# Patient Record
Sex: MALE | URBAN - NONMETROPOLITAN AREA
[De-identification: names, ages, dates, MRNs, and addresses within clinical notes are randomized per-mention and may not be internally consistent; named-entity substitution may affect disease eponyms.]

---

## 2023-10-07 ENCOUNTER — APPOINTMENT (OUTPATIENT)
Dept: RADIOLOGY | Facility: HOSPITAL | Age: 48
End: 2023-10-07
Payer: COMMERCIAL

## 2023-10-07 ENCOUNTER — HOSPITAL ENCOUNTER (EMERGENCY)
Facility: HOSPITAL | Age: 48
Discharge: HOME | End: 2023-10-07
Attending: EMERGENCY MEDICINE
Payer: COMMERCIAL

## 2023-10-07 VITALS
HEIGHT: 70 IN | RESPIRATION RATE: 16 BRPM | OXYGEN SATURATION: 97 % | HEART RATE: 76 BPM | DIASTOLIC BLOOD PRESSURE: 73 MMHG | BODY MASS INDEX: 24.77 KG/M2 | SYSTOLIC BLOOD PRESSURE: 118 MMHG | TEMPERATURE: 97.3 F | WEIGHT: 173 LBS

## 2023-10-07 DIAGNOSIS — R10.9 RIGHT FLANK PAIN: Primary | ICD-10-CM

## 2023-10-07 LAB
ALBUMIN SERPL BCP-MCNC: 4.3 G/DL (ref 3.4–5)
ALP SERPL-CCNC: 46 U/L (ref 33–120)
ALT SERPL W P-5'-P-CCNC: 23 U/L (ref 10–52)
ANION GAP SERPL CALC-SCNC: 11 MMOL/L (ref 10–20)
APPEARANCE UR: CLEAR
AST SERPL W P-5'-P-CCNC: 26 U/L (ref 9–39)
BASOPHILS # BLD AUTO: 0.08 X10*3/UL (ref 0–0.1)
BASOPHILS NFR BLD AUTO: 1 %
BILIRUB SERPL-MCNC: 0.5 MG/DL (ref 0–1.2)
BILIRUB UR STRIP.AUTO-MCNC: NEGATIVE MG/DL
BUN SERPL-MCNC: 8 MG/DL (ref 6–23)
CALCIUM SERPL-MCNC: 8.7 MG/DL (ref 8.6–10.3)
CHLORIDE SERPL-SCNC: 105 MMOL/L (ref 98–107)
CO2 SERPL-SCNC: 28 MMOL/L (ref 21–32)
COLOR UR: YELLOW
CREAT SERPL-MCNC: 0.73 MG/DL (ref 0.5–1.3)
EOSINOPHIL # BLD AUTO: 0.24 X10*3/UL (ref 0–0.7)
EOSINOPHIL NFR BLD AUTO: 2.9 %
ERYTHROCYTE [DISTWIDTH] IN BLOOD BY AUTOMATED COUNT: 12 % (ref 11.5–14.5)
GFR SERPL CREATININE-BSD FRML MDRD: >90 ML/MIN/1.73M*2
GLUCOSE SERPL-MCNC: 114 MG/DL (ref 74–99)
GLUCOSE UR STRIP.AUTO-MCNC: NEGATIVE MG/DL
HCT VFR BLD AUTO: 39.7 % (ref 41–52)
HGB BLD-MCNC: 13.3 G/DL (ref 13.5–17.5)
IMM GRANULOCYTES # BLD AUTO: 0.03 X10*3/UL (ref 0–0.7)
IMM GRANULOCYTES NFR BLD AUTO: 0.4 % (ref 0–0.9)
KETONES UR STRIP.AUTO-MCNC: NEGATIVE MG/DL
LEUKOCYTE ESTERASE UR QL STRIP.AUTO: NEGATIVE
LIPASE SERPL-CCNC: 15 U/L (ref 9–82)
LYMPHOCYTES # BLD AUTO: 1.44 X10*3/UL (ref 1.2–4.8)
LYMPHOCYTES NFR BLD AUTO: 17.3 %
MCH RBC QN AUTO: 32 PG (ref 26–34)
MCHC RBC AUTO-ENTMCNC: 33.5 G/DL (ref 32–36)
MCV RBC AUTO: 95 FL (ref 80–100)
MONOCYTES # BLD AUTO: 0.59 X10*3/UL (ref 0.1–1)
MONOCYTES NFR BLD AUTO: 7.1 %
MUCOUS THREADS #/AREA URNS AUTO: NORMAL /LPF
NEUTROPHILS # BLD AUTO: 5.96 X10*3/UL (ref 1.2–7.7)
NEUTROPHILS NFR BLD AUTO: 71.3 %
NITRITE UR QL STRIP.AUTO: NEGATIVE
NRBC BLD-RTO: 0 /100 WBCS (ref 0–0)
PH UR STRIP.AUTO: 7 [PH]
PLATELET # BLD AUTO: 312 X10*3/UL (ref 150–450)
PMV BLD AUTO: 9.1 FL (ref 7.5–11.5)
POTASSIUM SERPL-SCNC: 4.1 MMOL/L (ref 3.5–5.3)
PROT SERPL-MCNC: 6.8 G/DL (ref 6.4–8.2)
PROT UR STRIP.AUTO-MCNC: ABNORMAL MG/DL
RBC # BLD AUTO: 4.16 X10*6/UL (ref 4.5–5.9)
RBC # UR STRIP.AUTO: NEGATIVE /UL
RBC #/AREA URNS AUTO: NORMAL /HPF
SODIUM SERPL-SCNC: 140 MMOL/L (ref 136–145)
SP GR UR STRIP.AUTO: 1.01
UROBILINOGEN UR STRIP.AUTO-MCNC: <2 MG/DL
WBC # BLD AUTO: 8.3 X10*3/UL (ref 4.4–11.3)
WBC #/AREA URNS AUTO: NORMAL /HPF

## 2023-10-07 PROCEDURE — 83690 ASSAY OF LIPASE: CPT | Performed by: EMERGENCY MEDICINE

## 2023-10-07 PROCEDURE — 2500000004 HC RX 250 GENERAL PHARMACY W/ HCPCS (ALT 636 FOR OP/ED): Performed by: EMERGENCY MEDICINE

## 2023-10-07 PROCEDURE — 85025 COMPLETE CBC W/AUTO DIFF WBC: CPT | Performed by: EMERGENCY MEDICINE

## 2023-10-07 PROCEDURE — 96374 THER/PROPH/DIAG INJ IV PUSH: CPT | Mod: 59

## 2023-10-07 PROCEDURE — 96361 HYDRATE IV INFUSION ADD-ON: CPT | Mod: 59

## 2023-10-07 PROCEDURE — 74176 CT ABD & PELVIS W/O CONTRAST: CPT | Mod: MG,59

## 2023-10-07 PROCEDURE — 2550000001 HC RX 255 CONTRASTS: Performed by: EMERGENCY MEDICINE

## 2023-10-07 PROCEDURE — A9698 NON-RAD CONTRAST MATERIALNOC: HCPCS | Performed by: EMERGENCY MEDICINE

## 2023-10-07 PROCEDURE — 74178 CT ABD&PLV WO CNTR FLWD CNTR: CPT | Performed by: RADIOLOGY

## 2023-10-07 PROCEDURE — 96375 TX/PRO/DX INJ NEW DRUG ADDON: CPT

## 2023-10-07 PROCEDURE — 99253 IP/OBS CNSLTJ NEW/EST LOW 45: CPT | Performed by: SURGERY

## 2023-10-07 PROCEDURE — 74177 CT ABD & PELVIS W/CONTRAST: CPT

## 2023-10-07 PROCEDURE — 99285 EMERGENCY DEPT VISIT HI MDM: CPT | Mod: 25 | Performed by: EMERGENCY MEDICINE

## 2023-10-07 PROCEDURE — 51702 INSERT TEMP BLADDER CATH: CPT

## 2023-10-07 PROCEDURE — 80053 COMPREHEN METABOLIC PANEL: CPT | Performed by: EMERGENCY MEDICINE

## 2023-10-07 PROCEDURE — 81003 URINALYSIS AUTO W/O SCOPE: CPT | Performed by: EMERGENCY MEDICINE

## 2023-10-07 PROCEDURE — 36415 COLL VENOUS BLD VENIPUNCTURE: CPT | Performed by: EMERGENCY MEDICINE

## 2023-10-07 RX ORDER — CYCLOBENZAPRINE HCL 10 MG
10 TABLET ORAL 3 TIMES DAILY PRN
Qty: 10 TABLET | Refills: 0 | Status: SHIPPED | OUTPATIENT
Start: 2023-10-07

## 2023-10-07 RX ORDER — KETOROLAC TROMETHAMINE 30 MG/ML
15 INJECTION, SOLUTION INTRAMUSCULAR; INTRAVENOUS ONCE
Status: COMPLETED | OUTPATIENT
Start: 2023-10-07 | End: 2023-10-07

## 2023-10-07 RX ORDER — OXYCODONE AND ACETAMINOPHEN 5; 325 MG/1; MG/1
1 TABLET ORAL EVERY 8 HOURS PRN
Qty: 9 TABLET | Refills: 0 | Status: SHIPPED | OUTPATIENT
Start: 2023-10-07 | End: 2023-10-10

## 2023-10-07 RX ORDER — MORPHINE SULFATE 4 MG/ML
4 INJECTION, SOLUTION INTRAMUSCULAR; INTRAVENOUS ONCE
Status: COMPLETED | OUTPATIENT
Start: 2023-10-07 | End: 2023-10-07

## 2023-10-07 RX ADMIN — HYDROMORPHONE HYDROCHLORIDE 0.5 MG: 1 INJECTION, SOLUTION INTRAMUSCULAR; INTRAVENOUS; SUBCUTANEOUS at 08:15

## 2023-10-07 RX ADMIN — SODIUM CHLORIDE 1000 ML: 9 INJECTION, SOLUTION INTRAVENOUS at 07:41

## 2023-10-07 RX ADMIN — MORPHINE SULFATE 4 MG: 4 INJECTION, SOLUTION INTRAMUSCULAR; INTRAVENOUS at 12:22

## 2023-10-07 RX ADMIN — IOHEXOL 500 ML: 12 SOLUTION ORAL at 09:54

## 2023-10-07 RX ADMIN — KETOROLAC TROMETHAMINE 15 MG: 30 INJECTION, SOLUTION INTRAMUSCULAR at 07:41

## 2023-10-07 RX ADMIN — IOHEXOL 90 ML: 350 INJECTION, SOLUTION INTRAVENOUS at 11:20

## 2023-10-07 ASSESSMENT — PAIN DESCRIPTION - PAIN TYPE: TYPE: ACUTE PAIN

## 2023-10-07 ASSESSMENT — COLUMBIA-SUICIDE SEVERITY RATING SCALE - C-SSRS
2. HAVE YOU ACTUALLY HAD ANY THOUGHTS OF KILLING YOURSELF?: NO
1. IN THE PAST MONTH, HAVE YOU WISHED YOU WERE DEAD OR WISHED YOU COULD GO TO SLEEP AND NOT WAKE UP?: NO
6. HAVE YOU EVER DONE ANYTHING, STARTED TO DO ANYTHING, OR PREPARED TO DO ANYTHING TO END YOUR LIFE?: NO

## 2023-10-07 ASSESSMENT — PAIN DESCRIPTION - PROGRESSION: CLINICAL_PROGRESSION: GRADUALLY WORSENING

## 2023-10-07 ASSESSMENT — PAIN SCALES - GENERAL
PAINLEVEL_OUTOF10: 5 - MODERATE PAIN
PAINLEVEL_OUTOF10: 8

## 2023-10-07 ASSESSMENT — PAIN - FUNCTIONAL ASSESSMENT
PAIN_FUNCTIONAL_ASSESSMENT: 0-10
PAIN_FUNCTIONAL_ASSESSMENT: 0-10

## 2023-10-07 ASSESSMENT — PAIN DESCRIPTION - FREQUENCY: FREQUENCY: CONSTANT/CONTINUOUS

## 2023-10-07 ASSESSMENT — PAIN DESCRIPTION - LOCATION: LOCATION: BACK

## 2023-10-07 ASSESSMENT — PAIN DESCRIPTION - ORIENTATION
ORIENTATION: RIGHT
ORIENTATION: RIGHT

## 2023-10-07 ASSESSMENT — PAIN DESCRIPTION - ONSET: ONSET: SUDDEN

## 2023-10-07 NOTE — ED PROVIDER NOTES
HPI   Chief Complaint   Patient presents with   • Back Pain     Patient to ED reference right sided back pain that radiates around into his flank that awoke he x 3 hours prior to ED. Negative any urinary changes and no history of kidney stones.       Limitations to History: None    HPI: 48-year-old male presents with concern for right flank pain which began 3 hours prior to arrival.  Sharp in nature.  Nonradiating.  Denies any fever, chills, nausea, vomiting, urinary symptoms.  Denies any fall or trauma.    ------------------------------------------------------------------------------------------------------------------------------------------  Physical Exam:    VS: As documented in the triage note and EMR flowsheet from this visit were reviewed.    Appearance: Alert. cooperative,  in no acute distress.   Skin: Intact,  dry skin, no lesions, rash, petechiae or purpura.   Eyes: PERRLA, EOMs intact,  Conjunctiva pink with no redness or exudates.   HENT: Normocephalic, atraumatic. Nares patent. No intraoral lesions.   Neck: Supple, without meningismus. Trachea at midline. No lymphadenopathy.  Pulmonary: Clear bilaterally with good chest wall excursion. No rales, rhonchi or wheezing. No accessory muscle use or stridor.  Cardiac: Regular rate and rhythm, no rubs, murmurs, or gallops. No JVD, Carotids without bruits.  Abdomen: Abdomen is soft, nontender, and nondistended.  No palpable organomegaly.  No rebound or guarding.  No CVA tenderness. Nonsurgical abdomen  Genitourinary: Exam deferred.  Musculoskeletal: Full range of motion.  Pulses full and equal. No cyanosis, clubbing, or edema.  Psychiatric: Appropriate mood and affect.                            No data recorded                Patient History   No past medical history on file.  No past surgical history on file.  No family history on file.  Social History     Tobacco Use   • Smoking status: Not on file   • Smokeless tobacco: Not on file   Substance Use Topics    • Alcohol use: Not on file   • Drug use: Not on file       Physical Exam   ED Triage Vitals [10/07/23 0710]   Temp Heart Rate Resp BP   36.3 °C (97.3 °F) 63 16 114/73      SpO2 Temp Source Heart Rate Source Patient Position   95 % Tympanic Monitor Lying      BP Location FiO2 (%)     Left arm --       Physical Exam    ED Course & MDM   Diagnoses as of 10/07/23 1308   Right flank pain       Medical Decision Making  Labs Reviewed  URINALYSIS WITH REFLEX MICROSCOPIC - Abnormal     Color, Urine                  Yellow                 Appearance, Urine             Clear                  Specific Gravity, Urine       1.012                  pH, Urine                     7.0                    Protein, Urine                30 (1+) (*)               Glucose, Urine                NEGATIVE                Blood, Urine                  NEGATIVE                Ketones, Urine                NEGATIVE                Bilirubin, Urine              NEGATIVE                Urobilinogen, Urine           <2.0                   Nitrite, Urine                NEGATIVE                Leukocyte Esterase, Urine     NEGATIVE             COMPREHENSIVE METABOLIC PANEL - Abnormal     Glucose                       114 (*)                Sodium                        140                    Potassium                     4.1                    Chloride                      105                    Bicarbonate                   28                     Anion Gap                     11                     Urea Nitrogen                 8                      Creatinine                    0.73                   eGFR                          >90                    Calcium                       8.7                    Albumin                       4.3                    Alkaline Phosphatase          46                     Total Protein                 6.8                    AST                           26                     Bilirubin, Total              0.5                     ALT                           23                  CBC WITH AUTO DIFFERENTIAL - Abnormal     WBC                           8.3                    nRBC                          0.0                    RBC                           4.16 (*)               Hemoglobin                    13.3 (*)               Hematocrit                    39.7 (*)               MCV                           95                     MCH                           32.0                   MCHC                          33.5                   RDW                           12.0                   Platelets                     312                    MPV                           9.1                    Neutrophils %                 71.3                   Immature Granulocytes %, Automated   0.4                    Lymphocytes %                 17.3                   Monocytes %                   7.1                    Eosinophils %                 2.9                    Basophils %                   1.0                    Neutrophils Absolute          5.96                   Immature Granulocytes Absolute, Au*   0.03                   Lymphocytes Absolute          1.44                   Monocytes Absolute            0.59                   Eosinophils Absolute          0.24                   Basophils Absolute            0.08                LIPASE - Normal     Lipase                        15                         Narrative: Venipuncture immediately after or during the administration of Metamizole may lead to falsely low results. Testing should be performed immediately prior to Metamizole dosing.  URINALYSIS MICROSCOPIC ONLY  CT abdomen pelvis w IV contrast   Final Result    Persistent left mid abdominal mesenteric swirling and narrowing of    redundant distal colon and small bowel loops concerning for internal    hernia. No evidence of associated bowel obstruction at this time,    although the proximal colon is distended with gas and  fecal debris.          Interval placement of Mcbride catheter with small amount of air in the    bladder. The bladder remains relatively distended.          MACRO:    None.          Signed by: Rosa Loera 10/7/2023 11:55 AM    Dictation workstation:   [id number]     CT abdomen pelvis wo IV contrast   Final Result    No evidence of urolithiasis or hydronephrosis. Distended urinary    bladder. Correlate with possible urinary retention or bladder outlet    obstruction.          Fecal and gaseous distention of the proximal colon. Unusual medial    displacement and tapering of the mid descending colon raising a    question of internal hernia.          Additional findings as described above.          MACRO:    None.          Signed by: Rosa Loera 10/7/2023 8:36 AM    Dictation workstation:   [id number]         Medical Decision Making:    Patient appears well and nontoxic.  Vital signs within normal limits.  Right flank pain.  Benign abdominal exam.  Patient treated with Toradol and 1 L of normal saline.  Patient continued to have pain and will be treated with intravenous Dilaudid.  CT shows no evidence of kidney stone but there is concern for possible descending: Internal hernia.  General surgery consulted, Dr. Sippy, who advised repeat CT with oral contrast.  Patient also had distended bladder on CT.  400 mL postvoid residual.  Mcbride catheter placed without resolution of his pain.  Repeat CT continues to show this concern for internal hernia.  General surgery again evaluates the patient at the bedside and speaks with radiology.  Patient has no pain overlying this area.  Decision was made that although he may have an internal hernia that is likely not the cause of his current pain and patient does wish to return to [place] for evaluation.  Patient will be driven by a friend home.  Patient also will be advised to follow-up with a urologist.  Stable at time of discharge.    Differential Diagnoses Considered: Kidney  stone, UTI, urinary retention, internal hernia    Independent Interpretation of Studies:  I independently interpreted: CT of the abdomen pelvis shows no evidence of kidney stone.    Escalation of Care:  Appropriate for discharge and follow-up in [place] with general surgeon as well as urologist.    Discussion of Management with Other Providers:   I discussed the patient/results with: General surgery.            Procedure  Procedures     Raheem Manzanares DO  10/07/23 7490

## 2023-10-07 NOTE — PROGRESS NOTES
General Surgery Update  Patient had repeat CT scan. Still shows concern for internal hernia involving a very redundant distal descending colon as well as a loop of small bowel. I reviewed the images with the reading Radiologist via telephone. In discussion, it is very possible that this is an incidental finding as there is no clear obstruction and no evidence of bowel ischemia. He continues to pass flatus. His abdominal exam is benign. He still only has pain on that right flank, no true abdominal pain. Vitals have remained within normal limits. I offered the option of diagnostic laparoscopy this afternoon. Patient is from [place], lives in [place], and after speaking with his wife, if he would require surgery would prefer to have it there. He is clinically stable, reasonable and reliable. I am therefore okay with discharge from our ED with instruction to seek care urgently once back in [place] if any worsening of pain, obstipation, or generalized abdominal pain (rather than focal right flank pain). He was agreeable with this plan. We will work on getting his CT scan results emailed to him so that he would have access to them if needed. I have also recommended that at some point in the near future he have a colonoscopy. Patient was in agreement with this plan.

## 2023-10-07 NOTE — CONSULTS
General Surgery Consultation    Patient: Velasquez Granda  : 1975  MRN: [id number]  Date of Consultation: 10/07/23    Referring Provider: Dr. Raheem Arias    Chief Complaint: Right flank pain    History of Present Illness: Velasquez Granda is a 48 y.o. old male seen at the request of Dr. Arias for evaluation of right flank pain. Pain was acute onset this morning. He has never had pain like this prior.  He has no nausea or vomiting.  He continues to pass flatus even after onset of this pain.  He typically has bowel movements once daily and his last bowel movement was yesterday.  He has no previous abdominal surgery.  He is a healthy 48-year-old male with no known medical conditions and no home medications.  He has never had a colonoscopy.  He has no family history of colon or rectal cancers.  Labs were within normal limits.  CT scan showed redundant and distended right and transverse colon with unusual medial position and tapering of the mid descending colon.  Small bowel was normal.  There was some vascular engorgement in the left mid abdominal mesentery in the area of the descending colon tapering raising question of possible internal hernia.  There was no free fluid or air.  He had a fairly distended bladder.  He had no renal stones or hydronephrosis.  Mcbride catheter has since been placed.    Medical History:  No known medical conditions.    Surgical History:  Tonsillectomy  No previous abdominal surgery    Home Medications:  No home medications.    Allergies:  NKDA    Family History:   No family history of colon or rectal cancer.    Social History:  Non-smoker.  Occasional social alcohol use.  No drug use.  He lives in [facility] and is visiting this area for the [facility] [place] this weekend.    ROS:  Constitutional:  no fever, sweats, and chills  Cardiovascular: No chest pain  Respiratory: No cough or shortness of breath  Gastrointestinal: + Right flank pain.  No abdominal pain.  No  "obstipation.  No nausea or vomiting.  No blood in the stool.  Genitourinary: + Urinary retention  Musculoskeletal: no weakness or swelling  Integumentary: no rashes  Neurological: no confusion  Endocrine: no heat or cold intolerance  Heme/Lymph: no easy bruising or bleeding    Objective:  /84   Pulse 73   Temp 36.3 °C (97.3 °F) (Tympanic)   Resp 16   Ht 1.778 m (5' 10\")   Wt 78.5 kg (173 lb)   SpO2 98%   BMI 24.82 kg/m²     Physical Exam:  Constitutional: No acute distress, conversant, pleasant, accompanied by his brother in the ED today  Neurologic: alert and oriented  Psych: appropriate affect  Ears, Nose, Mouth and Throat: mucus membranes moist  Pulmonary: No labored breathing  Cardiovascular: Regular rate and rhythm  Abdomen: soft, non-distended, non-tender to deep palpation through the abdomen, no surgical scars or hernias, BMI 24.8, tender to palpation on the inferior right flank just above level of iliac crest  Musculoskeletal: Moves all extremities, no edema  Skin: warm and dry    Labs:  CBC and CMP within normal limits    Imaging:  No evidence of urolithiasis or hydronephrosis.  Distended urinary bladder, possible urinary retention or bladder outlet obstruction.  Fecal and gaseous distention of the proximal colon.  Unusual medial displacement and tapering of the mid descending colon raising question of internal hernia.    Assessment and Plan: Velasquez Granda is a 48 y.o. old male with right flank pain.  He also had urinary retention.  Mcbride catheter has since been placed and bladder decompressed.  His flank pain persists.  He had an unusual appearance of the distal descending colon on CT scan.  With no previous abdominal surgery, it is unclear why he would potentially have an internal hernia although this was a concern based on the scan.  However, his exam is completely benign.  He is nontender to deep palpation throughout the abdomen.  That said, I do not have a good alternative etiology for " his right flank pain.  I think it would be reasonable to give oral contrast and repeat the CT scan, especially now that his bladder is decompressed.  If concern for potential internal hernia remains, I would be happy to reexamine the patient and potentially proceed with diagnostic laparoscopy.    Vania Gongora MD  10/7/2023